# Patient Record
(demographics unavailable — no encounter records)

---

## 2025-01-19 NOTE — HISTORY OF PRESENT ILLNESS
[FreeTextEntry1] : gerd [de-identified] : persistent upset stomach- heartburn and acid indigestion sympt taking omeprazole 40mg qd bms are nl.  sees cardio , dr adkins regularly for her other meds/conditions.  last seen in dec. next appt coming up soon.  Otherwise feels good. Appet, sleep, bms, voiding, mood all ok.  Reviewed all interim as well as relevant prior consultations, labs and radiological studies.

## 2025-01-19 NOTE — HISTORY OF PRESENT ILLNESS
[FreeTextEntry1] : gerd [de-identified] : persistent upset stomach- heartburn and acid indigestion sympt taking omeprazole 40mg qd bms are nl.  sees cardio , dr adkins regularly for her other meds/conditions.  last seen in dec. next appt coming up soon.  Otherwise feels good. Appet, sleep, bms, voiding, mood all ok.  Reviewed all interim as well as relevant prior consultations, labs and radiological studies.

## 2025-01-19 NOTE — ASSESSMENT
[FreeTextEntry1] : CAD- s/p cabg- cont asa, toprol, atorvastatin, zetia, cardio f/u HFrEF- on gdmt- entresto, toprol, aldactone. no sglt2 inh at this time. Gout- stable, cont allopurinol. chk uric acid morbid obesity- stable chronic venous insuf- lasix. chk labs, cardio/vascualr f/u gerd- incr omep to 40mg bid, lifestyle mod. GI eval.  Pt. was encouraged to do all relevant screening tests including but not limited to mammogram, gyn visit, colonoscopy, bone density, annual skin exam.  Discussed the importance and benefit of a healthy lifestyle including a heart healthy diet such as the Mediterranean diet and regular exercise as well as 7 hours of sleep nightly.

## 2025-02-24 NOTE — HISTORY OF PRESENT ILLNESS
[FreeTextEntry1] : back pain [de-identified] : ~1m lbp and sore legs.no trauma/fall, no b/b incont. no saddle anesth constipated at times.  Otherwise feels good. Appet, sleep, , voiding, mood all ok.  Reviewed all interim as well as relevant prior consultations, labs and radiological studies.

## 2025-02-24 NOTE — ASSESSMENT
[FreeTextEntry1] : CAD- s/p cabg- cont asa, toprol, atorvastatin, zetia, cardio f/u HFrEF- on gdmt- entresto, toprol, aldactone. no sglt2 inh at this time. Gout- stable, cont allopurinol. chk uric acid morbid obesity- stable chronic venous insuf- lasix. chk labs, cardio/vascualr f/u compression stockings. low Na diet. elevate legs lbp- strain/sprain/spasm. cant r/o oa/djd/hnp/stenosis- heat, tylenol/ ptx to start   Pt. was encouraged to do all relevant screening tests including but not limited to mammogram, gyn visit, colonoscopy, bone density, annual skin exam.  Discussed the importance and benefit of a healthy lifestyle including a heart healthy diet such as the Mediterranean diet and regular exercise as well as 7 hours of sleep nightly.

## 2025-02-24 NOTE — PHYSICAL EXAM
[No Acute Distress] : no acute distress [Well Nourished] : well nourished [Well Developed] : well developed [Well-Appearing] : well-appearing [Normal Sclera/Conjunctiva] : normal sclera/conjunctiva [PERRL] : pupils equal round and reactive to light [EOMI] : extraocular movements intact [Normal Outer Ear/Nose] : the outer ears and nose were normal in appearance [Normal Oropharynx] : the oropharynx was normal [No JVD] : no jugular venous distention [No Lymphadenopathy] : no lymphadenopathy [Supple] : supple [Thyroid Normal, No Nodules] : the thyroid was normal and there were no nodules present [No Respiratory Distress] : no respiratory distress  [No Accessory Muscle Use] : no accessory muscle use [Clear to Auscultation] : lungs were clear to auscultation bilaterally [Normal Rate] : normal rate  [Regular Rhythm] : with a regular rhythm [Normal S1, S2] : normal S1 and S2 [No Murmur] : no murmur heard [No Carotid Bruits] : no carotid bruits [No Abdominal Bruit] : a ~M bruit was not heard ~T in the abdomen [No Varicosities] : no varicosities [Pedal Pulses Present] : the pedal pulses are present [No Palpable Aorta] : no palpable aorta [No Extremity Clubbing/Cyanosis] : no extremity clubbing/cyanosis [Soft] : abdomen soft [Non Tender] : non-tender [Non-distended] : non-distended [No Masses] : no abdominal mass palpated [No HSM] : no HSM [Normal Bowel Sounds] : normal bowel sounds [Normal Posterior Cervical Nodes] : no posterior cervical lymphadenopathy [Normal Anterior Cervical Nodes] : no anterior cervical lymphadenopathy [No CVA Tenderness] : no CVA  tenderness [No Spinal Tenderness] : no spinal tenderness [No Joint Swelling] : no joint swelling [Grossly Normal Strength/Tone] : grossly normal strength/tone [Coordination Grossly Intact] : coordination grossly intact [No Focal Deficits] : no focal deficits [Normal Gait] : normal gait [Deep Tendon Reflexes (DTR)] : deep tendon reflexes were 2+ and symmetric [Normal Affect] : the affect was normal [Normal Insight/Judgement] : insight and judgment were intact [de-identified] : 1+ ble indurated edema with hyperpig.

## 2025-03-13 NOTE — ASSESSMENT
[FreeTextEntry1] : requested 24 hour urine calcium and sonogram guided biopsy with PTH washing and specimen sent for P16. to call next week for results. patient has been given the opportunity to ask questions, and all of the patient's questions have been answered to their satisfaction
Normal vision: sees adequately in most situations; can see medication labels, newsprint

## 2025-03-13 NOTE — HISTORY OF PRESENT ILLNESS
[de-identified] : Pt c/o elevated PTH level and neck mass on sonogram.  occasional bone and constipation. denies fatigue and kidney stones, dysphagia, hoarseness, SOB or RT exposure.  Ca 9.4,  ,  Vitamin D  95, normal TFTs sonogram: Left 1.1 cm Thyroid nodule and cystic mass superior to Right lobe 3.8 cm I have reviewed all old and new data and available images.  Additional information was obtained from others present at the time of visit to ensure the completeness of the history

## 2025-03-13 NOTE — CONSULT LETTER
[Dear  ___] : Dear  [unfilled], [Consult Letter:] : I had the pleasure of evaluating your patient, [unfilled]. [Please see my note below.] : Please see my note below. [Sincerely,] : Sincerely, [FreeTextEntry2] : Dr. Lázaro St [FreeTextEntry3] : Ja Bauer MD, FACS System Director, Endocrine Surgery [facility] Associate  Professor of Surgery [facility] [employer] [facility]

## 2025-03-13 NOTE — PHYSICAL EXAM
[de-identified] : 3 cm right upper/mid jugular neck mass, well circumscribed and mobile [de-identified] : no palpable thyroid nodules [Nasal Endoscopy Performed] : nasal endoscopy was performed, see procedure section for findings [Laryngoscopy Performed] : laryngoscopy was performed, see procedure section for findings [Midline] : located in midline position [Normal] : orientation to person, place, and time: normal [de-identified] : fiberoptic laryngoscopy shows normal vocal cord mobility bilaterally with no lesions noted

## 2025-05-21 NOTE — PHYSICAL EXAM
[No Acute Distress] : no acute distress [Well Nourished] : well nourished [Well Developed] : well developed [Well-Appearing] : well-appearing [Normal Sclera/Conjunctiva] : normal sclera/conjunctiva [PERRL] : pupils equal round and reactive to light [EOMI] : extraocular movements intact [Normal Outer Ear/Nose] : the outer ears and nose were normal in appearance [Normal Oropharynx] : the oropharynx was normal [No JVD] : no jugular venous distention [No Lymphadenopathy] : no lymphadenopathy [Supple] : supple [Thyroid Normal, No Nodules] : the thyroid was normal and there were no nodules present [No Respiratory Distress] : no respiratory distress  [No Accessory Muscle Use] : no accessory muscle use [Clear to Auscultation] : lungs were clear to auscultation bilaterally [Normal Rate] : normal rate  [Regular Rhythm] : with a regular rhythm [Normal S1, S2] : normal S1 and S2 [No Murmur] : no murmur heard [No Carotid Bruits] : no carotid bruits [No Abdominal Bruit] : a ~M bruit was not heard ~T in the abdomen [No Varicosities] : no varicosities [Pedal Pulses Present] : the pedal pulses are present [No Palpable Aorta] : no palpable aorta [No Extremity Clubbing/Cyanosis] : no extremity clubbing/cyanosis [Normal Appearance] : normal in appearance [No Nipple Discharge] : no nipple discharge [No Axillary Lymphadenopathy] : no axillary lymphadenopathy [Soft] : abdomen soft [Non Tender] : non-tender [Non-distended] : non-distended [No Masses] : no abdominal mass palpated [No HSM] : no HSM [Normal Bowel Sounds] : normal bowel sounds [Normal Posterior Cervical Nodes] : no posterior cervical lymphadenopathy [Normal Anterior Cervical Nodes] : no anterior cervical lymphadenopathy [No CVA Tenderness] : no CVA  tenderness [No Spinal Tenderness] : no spinal tenderness [Grossly Normal Strength/Tone] : grossly normal strength/tone [Coordination Grossly Intact] : coordination grossly intact [No Focal Deficits] : no focal deficits [Normal Gait] : normal gait [Deep Tendon Reflexes (DTR)] : deep tendon reflexes were 2+ and symmetric [Normal Affect] : the affect was normal [Normal Insight/Judgement] : insight and judgment were intact [de-identified] : floor of mouth- R 2 dark spots, Left upper gum- papillomatous growth [de-identified] : 1+ ble indurated edema with hyperpig. [de-identified] : bilat knees sig djd changes

## 2025-05-21 NOTE — HEALTH RISK ASSESSMENT
[Good] : ~his/her~  mood as  good [No] : In the past 12 months have you used drugs other than those required for medical reasons? No [0] : 2) Feeling down, depressed, or hopeless: Not at all (0) [PHQ-2 Negative - No further assessment needed] : PHQ-2 Negative - No further assessment needed [Yes] : Reviewed medication list for presence of high-risk medications. [Fully functional (bathing, dressing, toileting, transferring, walking, feeding)] : Fully functional (bathing, dressing, toileting, transferring, walking, feeding) [Fully functional (using the telephone, shopping, preparing meals, housekeeping, doing laundry, using] : Fully functional and needs no help or supervision to perform IADLs (using the telephone, shopping, preparing meals, housekeeping, doing laundry, using transportation, managing medications and managing finances) [Never (0 pts)] : Never (0 points) [No falls in past year] : Patient reported no falls in the past year [Never] : Never [Patient declined mammogram] : Patient declined mammogram [Patient declined PAP Smear] : Patient declined PAP Smear [Patient declined bone density test] : Patient declined bone density test [Patient declined colonoscopy] : Patient declined colonoscopy [None] : None [With Family] : lives with family [Retired] : retired [Single] : single [Audit-CScore] : 0 [de-identified] : none [de-identified] : reg [[id number]] : 0 [Change in mental status noted] : No change in mental status noted [Language] : denies difficulty with language [Behavior] : denies difficulty with behavior [Handling Complex Tasks] : denies difficulty handling complex tasks [Reasoning] : denies difficulty with reasoning [Reports changes in hearing] : Reports no changes in hearing [Reports changes in vision] : Reports no changes in vision [Reports changes in dental health] : Reports no changes in dental health [AdvancecareDate] : 5/21/25

## 2025-05-21 NOTE — ASSESSMENT
[FreeTextEntry1] : CAD- s/p cabg- cont asa, toprol, atorvastatin, zetia, cardio f/u HFrEF- on gdmt- entresto, toprol,farxiga, lasix, cardio f/u Gout- stable, cont allopurinol. chk uric acid morbid obesity- stable chronic venous insuf- lasix. chk labs, cardio/vascualr f/u compression stockings. low Na diet. elevate legs lbp- strain/sprain/spasm. cant r/o oa/djd/hnp/stenosis- heat, tylenol/ ptx. if persists, then ortho for knees , mri spine.  hemmorhoids- avoid straining, constipation- fluids/fiber. use tucks, hydrocort 2.5%, colorectal eval oral lesions- oral surgeon eval.    Pt. was encouraged to do all relevant screening tests including but not limited to mammogram, gyn visit, colonoscopy, bone density, annual skin exam.  Discussed the importance and benefit of a healthy lifestyle including a heart healthy diet such as the Mediterranean diet and regular exercise as well as 7 hours of sleep nightly.    [Vaccines Reviewed] : Immunizations reviewed today. Please see immunization details in the vaccine log within the immunization flowsheet.

## 2025-05-21 NOTE — HISTORY OF PRESENT ILLNESS
[FreeTextEntry1] : matilde [de-identified] : c/o ble pain , primarily knees. tylenol effective.  hemmorhoid Otherwise feels good. Appet, sleep, bms, voiding, mood all ok Reviewed all interim as well as relevant prior consultations, labs and radiological studies.